# Patient Record
Sex: MALE | Race: OTHER
[De-identification: names, ages, dates, MRNs, and addresses within clinical notes are randomized per-mention and may not be internally consistent; named-entity substitution may affect disease eponyms.]

---

## 2020-06-30 ENCOUNTER — HOSPITAL ENCOUNTER (EMERGENCY)
Dept: HOSPITAL 8 - ED | Age: 70
Discharge: HOME | End: 2020-06-30
Payer: MEDICARE

## 2020-06-30 VITALS — WEIGHT: 189.38 LBS | BODY MASS INDEX: 29.72 KG/M2 | HEIGHT: 67 IN

## 2020-06-30 VITALS — DIASTOLIC BLOOD PRESSURE: 89 MMHG | SYSTOLIC BLOOD PRESSURE: 137 MMHG

## 2020-06-30 DIAGNOSIS — F17.200: ICD-10-CM

## 2020-06-30 DIAGNOSIS — L02.414: Primary | ICD-10-CM

## 2020-06-30 DIAGNOSIS — M79.602: ICD-10-CM

## 2020-06-30 PROCEDURE — 10060 I&D ABSCESS SIMPLE/SINGLE: CPT

## 2020-06-30 PROCEDURE — 99284 EMERGENCY DEPT VISIT MOD MDM: CPT

## 2020-07-04 ENCOUNTER — HOSPITAL ENCOUNTER (EMERGENCY)
Dept: HOSPITAL 8 - ED | Age: 70
Discharge: HOME | End: 2020-07-04
Payer: MEDICARE

## 2020-07-04 VITALS — WEIGHT: 188.5 LBS | HEIGHT: 66 IN | BODY MASS INDEX: 30.29 KG/M2

## 2020-07-04 DIAGNOSIS — F17.200: ICD-10-CM

## 2020-07-04 DIAGNOSIS — Z48.01: Primary | ICD-10-CM

## 2020-07-04 PROCEDURE — 99283 EMERGENCY DEPT VISIT LOW MDM: CPT

## 2020-12-11 ENCOUNTER — HOSPITAL ENCOUNTER (EMERGENCY)
Dept: HOSPITAL 8 - ED | Age: 70
Discharge: HOME | End: 2020-12-11
Payer: MEDICARE

## 2020-12-11 VITALS — WEIGHT: 187.39 LBS | BODY MASS INDEX: 30.12 KG/M2 | HEIGHT: 66 IN

## 2020-12-11 VITALS — DIASTOLIC BLOOD PRESSURE: 73 MMHG | SYSTOLIC BLOOD PRESSURE: 123 MMHG

## 2020-12-11 DIAGNOSIS — F15.10: ICD-10-CM

## 2020-12-11 DIAGNOSIS — F11.10: ICD-10-CM

## 2020-12-11 DIAGNOSIS — F17.200: ICD-10-CM

## 2020-12-11 DIAGNOSIS — L03.114: Primary | ICD-10-CM

## 2020-12-11 PROCEDURE — 99283 EMERGENCY DEPT VISIT LOW MDM: CPT

## 2020-12-11 PROCEDURE — 10060 I&D ABSCESS SIMPLE/SINGLE: CPT

## 2020-12-15 ENCOUNTER — HOSPITAL ENCOUNTER (EMERGENCY)
Dept: HOSPITAL 8 - ED | Age: 70
Discharge: HOME | End: 2020-12-15
Payer: MEDICARE

## 2020-12-15 VITALS — BODY MASS INDEX: 29.94 KG/M2 | HEIGHT: 66 IN | WEIGHT: 186.29 LBS

## 2020-12-15 VITALS — DIASTOLIC BLOOD PRESSURE: 82 MMHG | SYSTOLIC BLOOD PRESSURE: 127 MMHG

## 2020-12-15 DIAGNOSIS — Z48.01: Primary | ICD-10-CM

## 2020-12-15 PROCEDURE — 99282 EMERGENCY DEPT VISIT SF MDM: CPT

## 2021-01-10 ENCOUNTER — HOSPITAL ENCOUNTER (EMERGENCY)
Dept: HOSPITAL 8 - ED | Age: 71
Discharge: HOME | End: 2021-01-10
Payer: MEDICARE

## 2021-01-10 VITALS — WEIGHT: 188.05 LBS | BODY MASS INDEX: 30.22 KG/M2 | HEIGHT: 66 IN

## 2021-01-10 VITALS — DIASTOLIC BLOOD PRESSURE: 62 MMHG | SYSTOLIC BLOOD PRESSURE: 110 MMHG

## 2021-01-10 DIAGNOSIS — F17.200: ICD-10-CM

## 2021-01-10 DIAGNOSIS — L03.115: Primary | ICD-10-CM
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
## 2021-01-10 DIAGNOSIS — F10.10: ICD-10-CM

## 2021-01-10 LAB
ALBUMIN SERPL-MCNC: 3.5 G/DL (ref 3.4–5)
ALP SERPL-CCNC: 69 U/L (ref 45–117)
ALT SERPL-CCNC: 22 U/L (ref 12–78)
ANION GAP SERPL CALC-SCNC: 3 MMOL/L (ref 5–15)
BASOPHILS # BLD AUTO: 0.1 X10^3/UL (ref 0–0.1)
BASOPHILS NFR BLD AUTO: 1 % (ref 0–1)
BILIRUB SERPL-MCNC: 0.7 MG/DL (ref 0.2–1)
CALCIUM SERPL-MCNC: 8.7 MG/DL (ref 8.5–10.1)
CHLORIDE SERPL-SCNC: 109 MMOL/L (ref 98–107)
CREAT SERPL-MCNC: 1.28 MG/DL (ref 0.7–1.3)
EOSINOPHIL # BLD AUTO: 0.5 X10^3/UL (ref 0–0.4)
EOSINOPHIL NFR BLD AUTO: 5 % (ref 1–7)
ERYTHROCYTE [DISTWIDTH] IN BLOOD BY AUTOMATED COUNT: 15.7 % (ref 9.4–14.8)
LYMPHOCYTES # BLD AUTO: 1.5 X10^3/UL (ref 1–3.4)
LYMPHOCYTES NFR BLD AUTO: 14 % (ref 22–44)
MCH RBC QN AUTO: 26.3 PG (ref 27.5–34.5)
MCHC RBC AUTO-ENTMCNC: 32.5 G/DL (ref 33.2–36.2)
MD: NO
MONOCYTES # BLD AUTO: 0.7 X10^3/UL (ref 0.2–0.8)
MONOCYTES NFR BLD AUTO: 7 % (ref 2–9)
NEUTROPHILS # BLD AUTO: 8 X10^3/UL (ref 1.8–6.8)
NEUTROPHILS NFR BLD AUTO: 74 % (ref 42–75)
PLATELET # BLD AUTO: 233 X10^3/UL (ref 130–400)
PMV BLD AUTO: 7.9 FL (ref 7.4–10.4)
PROT SERPL-MCNC: 8.1 G/DL (ref 6.4–8.2)
RBC # BLD AUTO: 4.89 X10^6/UL (ref 4.38–5.82)

## 2021-01-10 PROCEDURE — 80053 COMPREHEN METABOLIC PANEL: CPT

## 2021-01-10 PROCEDURE — 73701 CT LOWER EXTREMITY W/DYE: CPT

## 2021-01-10 PROCEDURE — 36415 COLL VENOUS BLD VENIPUNCTURE: CPT

## 2021-01-10 PROCEDURE — 99285 EMERGENCY DEPT VISIT HI MDM: CPT

## 2021-01-10 PROCEDURE — 85025 COMPLETE CBC W/AUTO DIFF WBC: CPT

## 2021-01-10 NOTE — NUR
Report received from FIONA Munoz. This RN to assume care. Awaiting CT results. 
Patient up to bathroom.

## 2021-01-10 NOTE — NUR
This pt is an IV drug user. He is continually asking "how long will this take, 
how long will I be here?" Pt has been to and from imaging.

## 2021-04-23 ENCOUNTER — HOSPITAL ENCOUNTER (EMERGENCY)
Dept: HOSPITAL 8 - ED | Age: 71
LOS: 1 days | Discharge: HOME | End: 2021-04-24
Payer: MEDICARE

## 2021-04-23 VITALS — WEIGHT: 176.37 LBS | BODY MASS INDEX: 28.34 KG/M2 | HEIGHT: 66 IN

## 2021-04-23 VITALS — SYSTOLIC BLOOD PRESSURE: 148 MMHG | DIASTOLIC BLOOD PRESSURE: 92 MMHG
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
## 2021-04-23 DIAGNOSIS — S41.112D: Primary | ICD-10-CM

## 2021-04-23 PROCEDURE — 99281 EMR DPT VST MAYX REQ PHY/QHP: CPT

## 2021-04-23 NOTE — NUR
PT AMBULATED TO ROOM, NO DISTRESS NOTED.  PT HAS 4 SUTURES TO POSTERIOR PORTION 
OF LEFT ARM. EDGES APPROXIMATED AND HEALED, AND SMALL SCABS NOTED.  4 SUTURES 
REMOVED, WITHOUT INCIDENT, AND INTACT.  PT TOLERATED WELL, AND AWAITING D/C 
INSTRUCTIONS.

## 2021-09-24 ENCOUNTER — HOSPITAL ENCOUNTER (EMERGENCY)
Dept: HOSPITAL 8 - ED | Age: 71
Discharge: HOME | End: 2021-09-24
Payer: MEDICARE

## 2021-09-24 ENCOUNTER — HOSPITAL ENCOUNTER (EMERGENCY)
Dept: HOSPITAL 8 - ED | Age: 71
Discharge: LEFT BEFORE DISCHARGE | End: 2021-09-24
Payer: MEDICARE

## 2021-09-24 VITALS — BODY MASS INDEX: 26.18 KG/M2 | WEIGHT: 162.92 LBS | HEIGHT: 66 IN

## 2021-09-24 VITALS — BODY MASS INDEX: 25.97 KG/M2 | WEIGHT: 161.6 LBS | HEIGHT: 66 IN

## 2021-09-24 VITALS — DIASTOLIC BLOOD PRESSURE: 98 MMHG | SYSTOLIC BLOOD PRESSURE: 140 MMHG

## 2021-09-24 VITALS — DIASTOLIC BLOOD PRESSURE: 70 MMHG | SYSTOLIC BLOOD PRESSURE: 150 MMHG

## 2021-09-24 DIAGNOSIS — F17.210: ICD-10-CM

## 2021-09-24 DIAGNOSIS — L03.113: Primary | ICD-10-CM

## 2021-09-24 DIAGNOSIS — Z90.89: ICD-10-CM

## 2021-09-24 DIAGNOSIS — Z88.4: ICD-10-CM

## 2021-09-24 DIAGNOSIS — K04.7: Primary | ICD-10-CM

## 2021-09-24 DIAGNOSIS — L02.413: ICD-10-CM

## 2021-09-24 PROCEDURE — 99283 EMERGENCY DEPT VISIT LOW MDM: CPT

## 2021-09-24 PROCEDURE — 10060 I&D ABSCESS SIMPLE/SINGLE: CPT

## 2021-09-24 PROCEDURE — 99281 EMR DPT VST MAYX REQ PHY/QHP: CPT

## 2021-09-27 ENCOUNTER — HOSPITAL ENCOUNTER (EMERGENCY)
Dept: HOSPITAL 8 - ED | Age: 71
Discharge: HOME | End: 2021-09-27
Payer: MEDICARE

## 2021-09-27 VITALS — HEIGHT: 66 IN | WEIGHT: 165.35 LBS | BODY MASS INDEX: 26.57 KG/M2

## 2021-09-27 VITALS — SYSTOLIC BLOOD PRESSURE: 147 MMHG | DIASTOLIC BLOOD PRESSURE: 84 MMHG

## 2021-09-27 DIAGNOSIS — Z72.9: ICD-10-CM

## 2021-09-27 DIAGNOSIS — Z48.01: Primary | ICD-10-CM

## 2021-09-27 DIAGNOSIS — F17.210: ICD-10-CM

## 2021-09-27 PROCEDURE — 99406 BEHAV CHNG SMOKING 3-10 MIN: CPT

## 2021-09-27 NOTE — NUR
Patient given discharge instructions and they have confirmed that they 
understand the instructions.  Patient ambulatory with steady gait. NAD, all 
questions answered appropriately, denies additional needs at this time. No 
personal belongings left in room after discharge. 



PATIENT PROVIDED WITH TAXI VOUCHER.